# Patient Record
Sex: MALE
[De-identification: names, ages, dates, MRNs, and addresses within clinical notes are randomized per-mention and may not be internally consistent; named-entity substitution may affect disease eponyms.]

---

## 2018-05-30 ENCOUNTER — HOSPITAL ENCOUNTER (EMERGENCY)
Dept: HOSPITAL 25 - UCEAST | Age: 18
Discharge: HOME | End: 2018-05-30
Payer: COMMERCIAL

## 2018-05-30 DIAGNOSIS — E11.9: Primary | ICD-10-CM

## 2018-05-30 DIAGNOSIS — Z79.84: ICD-10-CM
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
## 2018-05-30 DIAGNOSIS — Z79.4: ICD-10-CM
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
## 2018-05-30 DIAGNOSIS — S01.511A: ICD-10-CM
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
## 2018-05-30 PROCEDURE — 99201: CPT

## 2018-05-30 PROCEDURE — G0463 HOSPITAL OUTPT CLINIC VISIT: HCPCS

## 2018-05-30 NOTE — UC
Raissa PIMENTEL Emily, scribed for Landon Evans MD on 05/30/18 at 1715 .





 Dental HPI





- HPI Summary


HPI Summary: 


This patient is a 18 year old M presenting to urgent care with a chief 

complaint of laceration inside left upper lip after getting hit in the lip with 

a book yesterday night. The patient rates the pain 2/10 in severity. Symptoms 

aggravated by nothing. Symptoms alleviated by nothing. Patient reports swollen 

left upper lip.








- History of Current Complaint


Chief Complaint: UCDentalProblem


Stated Complaint: DENTAL COMPLAINT


Time Seen by Provider: 05/30/18 17:09


Hx Obtained From: Patient


Onset/Duration: Sudden Onset, Lasting Days, Still Present


Severity: Moderate


Pain Intensity: 2


Pain Scale Used: 0-10 Numeric


Aggravating Factor(s): Nothing


Alleviating Factor(s): Nothing





- Allergies/Home Medications


Allergies/Adverse Reactions: 


 Allergies











Allergy/AdvReac Type Severity Reaction Status Date / Time


 


No Known Allergies Allergy   Verified 05/30/18 16:46











Home Medications: 


 Home Medications





Insulin Glargine,Hum.rec.anlog [Lantus] 8 unit SC DAILY 05/30/18 [History 

Confirmed 05/30/18]


Metformin ER (NF) 500 mg PO QID 05/30/18 [History Confirmed 05/30/18]


glyBURIDE [Glyburide] 5 mg PO DAILY 05/30/18 [History Confirmed 05/30/18]











PMH/Surg Hx/FS Hx/Imm Hx


Previously Healthy: No


Endocrine History: Diabetes


Cardiovascular History: Other


Other Cardiovascular History: Negative HTN





- Surgical History


Surgical History: None





- Family History


Known Family History: Positive: Diabetes


   Negative: Cardiac Disease





- Social History


Occupation: Student


Lives: With Family


Alcohol Use: None


Substance Use Type: None


Smoking Status (MU): Never Smoked Tobacco





Review of Systems


Skin: Other - Positive laceration inside left upper lip


Musculoskeletal: Edema


All Other Systems Reviewed And Are Negative: Yes





Physical Exam





- Summary


Physical Exam Summary: 


General: well-appearing, no pain distress


Skin: warm, color reflects adequate perfusion, dry


Head: normal


Eyes: EOMI, YAW


ENT: normal


Dental Exam: 1 cm long laceration on the inside of his mouth, upper lip. Small 

amount of blood present. There is no laceration on the lip that you can see 

outside of the mouth. This is not a full thickness laceration. Teeth are normal.


Neck: supple, nontender


Respiratory: CTA, breath sounds present


Cardiovascular: RRR


Abdomen: soft, nontender


Bowel: present


Musculoskeletal: normal, strength/ROM intact


Neurological: sensory/motor intact, A&O x3


Psychological: affect/mood appropriate





Triage Information Reviewed: Yes


Vital Signs: 


 Initial Vital Signs











Temp  98.6 F   05/30/18 16:43


 


Pulse  88   05/30/18 16:43


 


Resp  18   05/30/18 16:43


 


BP  132/91   05/30/18 16:43


 


Pulse Ox  100   05/30/18 16:43











Vital Signs Reviewed: Yes





Dental Complaint Course/Dx





- Course


Course Of Treatment: THE LACERATION IS COMPLETELY WITHIN THE MOUTH. IT IS NOT A 

FULL THICKNESS LACERATION.





- Differential Dx/Diagnosis


Provider Diagnoses: ORAL LACERATION





Discharge





- Sign-Out/Discharge


Documenting (check all that apply): Discharge/Admit/Transfer





- Discharge Plan


Condition: Stable


Disposition: HOME


Referrals: 


Critical access hospital [Provider Group]


Additional Instructions: 


FOLLOW UP WITH YOUR DOCTOR IF NOT COMPLETELY IMPROVED.


RINSE YOUR MOUTH MULTIPLE TIMES DAILY.


EAT SOFTER FOODS UNTIL COMPLETELY HEALED.


GET RECHECKED FOR ANY WORSENING OF YOUR CONDITION OR QUESTIONS OR CONCERNS.





- Billing Disposition and Condition


Condition: STABLE


Disposition: HOME





The documentation as recorded by the Raissa robbins Emily accurately reflects the 

service I personally performed and the decisions made by me, Landon Evans MD.